# Patient Record
Sex: MALE | Race: WHITE | NOT HISPANIC OR LATINO | Employment: FULL TIME | URBAN - METROPOLITAN AREA
[De-identification: names, ages, dates, MRNs, and addresses within clinical notes are randomized per-mention and may not be internally consistent; named-entity substitution may affect disease eponyms.]

---

## 2023-09-15 PROBLEM — E11.9 TYPE 2 DIABETES MELLITUS (MULTI): Status: ACTIVE | Noted: 2023-09-15

## 2023-09-15 PROBLEM — E78.2 MIXED HYPERLIPIDEMIA: Status: ACTIVE | Noted: 2023-09-15

## 2023-09-15 PROBLEM — E10.39: Status: ACTIVE | Noted: 2023-09-15

## 2023-09-15 PROBLEM — S82.209A FRACTURE OF TIBIA: Status: ACTIVE | Noted: 2023-09-15

## 2023-09-15 PROBLEM — E10.65 TYPE 1 DIABETES MELLITUS WITH HYPERGLYCEMIA (MULTI): Status: ACTIVE | Noted: 2023-09-15

## 2023-09-15 PROBLEM — S82.402A: Status: ACTIVE | Noted: 2023-09-15

## 2023-09-15 PROBLEM — S82.202A UNSPECIFIED FRACTURE OF SHAFT OF LEFT TIBIA, INITIAL ENCOUNTER FOR CLOSED FRACTURE: Status: ACTIVE | Noted: 2023-09-15

## 2023-09-15 PROBLEM — M25.572 PAIN IN LEFT ANKLE AND JOINTS OF LEFT FOOT: Status: ACTIVE | Noted: 2023-09-15

## 2023-09-15 PROBLEM — S82.899A CLOSED FRACTURE OF ANKLE: Status: ACTIVE | Noted: 2023-09-15

## 2023-09-15 PROBLEM — I10 ESSENTIAL (PRIMARY) HYPERTENSION: Status: ACTIVE | Noted: 2023-09-15

## 2023-09-15 PROBLEM — L03.116 CELLULITIS OF LEFT LOWER LIMB: Status: ACTIVE | Noted: 2023-09-15

## 2023-09-15 RX ORDER — OXYCODONE AND ACETAMINOPHEN 5; 325 MG/1; MG/1
1 TABLET ORAL EVERY 4 HOURS PRN
COMMUNITY
End: 2024-04-11

## 2023-09-15 RX ORDER — INSULIN GLARGINE 300 U/ML
30 INJECTION, SOLUTION SUBCUTANEOUS DAILY
COMMUNITY
End: 2023-10-09

## 2023-09-15 RX ORDER — LISINOPRIL 40 MG/1
1 TABLET ORAL DAILY
COMMUNITY
End: 2024-04-11

## 2023-09-15 RX ORDER — INSULIN DEGLUDEC 100 U/ML
22 INJECTION, SOLUTION SUBCUTANEOUS DAILY
COMMUNITY
End: 2024-04-11

## 2023-09-15 RX ORDER — LISINOPRIL 20 MG/1
2 TABLET ORAL DAILY
COMMUNITY
End: 2023-11-20

## 2023-09-15 RX ORDER — NAPROXEN SODIUM 220 MG/1
1 TABLET, FILM COATED ORAL DAILY
COMMUNITY

## 2023-09-15 RX ORDER — FELODIPINE 5 MG/1
1 TABLET, EXTENDED RELEASE ORAL DAILY
COMMUNITY

## 2023-09-15 RX ORDER — INSULIN LISPRO 100 [IU]/ML
50 INJECTION, SOLUTION INTRAVENOUS; SUBCUTANEOUS DAILY
COMMUNITY
End: 2023-11-22 | Stop reason: SDUPTHER

## 2023-09-15 RX ORDER — METOPROLOL TARTRATE 50 MG/1
0.5 TABLET ORAL 2 TIMES DAILY
COMMUNITY

## 2023-09-15 RX ORDER — SIMVASTATIN 20 MG/1
1 TABLET, FILM COATED ORAL DAILY
COMMUNITY
End: 2024-03-15

## 2023-09-15 RX ORDER — AMLODIPINE BESYLATE 5 MG/1
1 TABLET ORAL DAILY
COMMUNITY
End: 2023-10-09

## 2023-10-08 DIAGNOSIS — E10.65 TYPE 1 DIABETES MELLITUS WITH HYPERGLYCEMIA (MULTI): Primary | ICD-10-CM

## 2023-10-09 RX ORDER — AMLODIPINE BESYLATE 5 MG/1
5 TABLET ORAL DAILY
Qty: 90 TABLET | Refills: 0 | Status: SHIPPED | OUTPATIENT
Start: 2023-10-09 | End: 2024-01-15

## 2023-10-09 RX ORDER — INSULIN GLARGINE 300 U/ML
INJECTION, SOLUTION SUBCUTANEOUS
Qty: 12 ML | Refills: 0 | Status: SHIPPED | OUTPATIENT
Start: 2023-10-09 | End: 2024-02-12

## 2023-11-19 DIAGNOSIS — I10 ESSENTIAL (PRIMARY) HYPERTENSION: Primary | ICD-10-CM

## 2023-11-20 RX ORDER — LISINOPRIL 20 MG/1
40 TABLET ORAL DAILY
Qty: 180 TABLET | Refills: 0 | Status: SHIPPED | OUTPATIENT
Start: 2023-11-20 | End: 2024-02-12

## 2023-11-22 DIAGNOSIS — E10.65 TYPE 1 DIABETES MELLITUS WITH HYPERGLYCEMIA (MULTI): Primary | ICD-10-CM

## 2023-11-22 RX ORDER — INSULIN LISPRO 100 [IU]/ML
50 INJECTION, SOLUTION INTRAVENOUS; SUBCUTANEOUS DAILY
Qty: 45 ML | Refills: 3 | Status: SHIPPED | OUTPATIENT
Start: 2023-11-22 | End: 2024-11-21

## 2024-01-15 DIAGNOSIS — E10.65 TYPE 1 DIABETES MELLITUS WITH HYPERGLYCEMIA (MULTI): ICD-10-CM

## 2024-01-15 RX ORDER — AMLODIPINE BESYLATE 5 MG/1
5 TABLET ORAL DAILY
Qty: 90 TABLET | Refills: 3 | Status: SHIPPED | OUTPATIENT
Start: 2024-01-15

## 2024-02-12 DIAGNOSIS — I10 ESSENTIAL (PRIMARY) HYPERTENSION: ICD-10-CM

## 2024-02-12 DIAGNOSIS — E10.65 TYPE 1 DIABETES MELLITUS WITH HYPERGLYCEMIA (MULTI): Primary | ICD-10-CM

## 2024-02-12 RX ORDER — INSULIN GLARGINE 300 U/ML
INJECTION, SOLUTION SUBCUTANEOUS
Qty: 12 ML | Refills: 0 | Status: SHIPPED | OUTPATIENT
Start: 2024-02-12

## 2024-02-12 RX ORDER — LISINOPRIL 20 MG/1
40 TABLET ORAL DAILY
Qty: 180 TABLET | Refills: 0 | Status: SHIPPED | OUTPATIENT
Start: 2024-02-12 | End: 2024-05-28

## 2024-02-12 RX ORDER — FLASH GLUCOSE SENSOR
KIT MISCELLANEOUS
Qty: 2 EACH | Refills: 0 | Status: SHIPPED | OUTPATIENT
Start: 2024-02-12 | End: 2024-03-15

## 2024-02-14 ENCOUNTER — TELEPHONE (OUTPATIENT)
Dept: ENDOCRINOLOGY | Facility: CLINIC | Age: 59
End: 2024-02-14
Payer: COMMERCIAL

## 2024-02-14 DIAGNOSIS — E10.65 TYPE 1 DIABETES MELLITUS WITH HYPERGLYCEMIA (MULTI): Primary | ICD-10-CM

## 2024-02-14 NOTE — TELEPHONE ENCOUNTER
Detail Level: Detailed
Left voice mail that orders are in place.  Advised patient to FAST for the labs.  
Patient requesting lab orders be placed so he can go for blood work prior to his appointment on 4/11/2024.  He has DM type 1.  He also states you monitor his blood pressure and cholesterol?      Please advise.       Last office visit appointment was 8/8/2023  
Quality 130: Documentation Of Current Medications In The Medical Record: Current Medications Documented

## 2024-02-17 ENCOUNTER — LAB (OUTPATIENT)
Dept: LAB | Facility: LAB | Age: 59
End: 2024-02-17
Payer: COMMERCIAL

## 2024-02-17 DIAGNOSIS — E10.65 TYPE 1 DIABETES MELLITUS WITH HYPERGLYCEMIA (MULTI): ICD-10-CM

## 2024-02-17 LAB
ALBUMIN SERPL-MCNC: 4.6 G/DL (ref 3.5–5)
ALP BLD-CCNC: 75 U/L (ref 35–125)
ALT SERPL-CCNC: 21 U/L (ref 5–40)
ANION GAP SERPL CALC-SCNC: 10 MMOL/L
AST SERPL-CCNC: 18 U/L (ref 5–40)
BASOPHILS # BLD AUTO: 0.07 X10*3/UL (ref 0–0.1)
BASOPHILS NFR BLD AUTO: 1 %
BILIRUB SERPL-MCNC: 1.5 MG/DL (ref 0.1–1.2)
BUN SERPL-MCNC: 14 MG/DL (ref 8–25)
CALCIUM SERPL-MCNC: 9.7 MG/DL (ref 8.5–10.4)
CHLORIDE SERPL-SCNC: 101 MMOL/L (ref 97–107)
CHOLEST SERPL-MCNC: 151 MG/DL (ref 133–200)
CHOLEST/HDLC SERPL: 2.6 {RATIO}
CO2 SERPL-SCNC: 30 MMOL/L (ref 24–31)
CREAT SERPL-MCNC: 0.9 MG/DL (ref 0.4–1.6)
EGFRCR SERPLBLD CKD-EPI 2021: >90 ML/MIN/1.73M*2
EOSINOPHIL # BLD AUTO: 0.36 X10*3/UL (ref 0–0.7)
EOSINOPHIL NFR BLD AUTO: 5 %
ERYTHROCYTE [DISTWIDTH] IN BLOOD BY AUTOMATED COUNT: 13.4 % (ref 11.5–14.5)
GLUCOSE SERPL-MCNC: 70 MG/DL (ref 65–99)
HCT VFR BLD AUTO: 47.4 % (ref 41–52)
HDLC SERPL-MCNC: 58 MG/DL
HGB BLD-MCNC: 15.8 G/DL (ref 13.5–17.5)
IMM GRANULOCYTES # BLD AUTO: 0.01 X10*3/UL (ref 0–0.7)
IMM GRANULOCYTES NFR BLD AUTO: 0.1 % (ref 0–0.9)
LDLC SERPL CALC-MCNC: 84 MG/DL (ref 65–130)
LYMPHOCYTES # BLD AUTO: 3.09 X10*3/UL (ref 1.2–4.8)
LYMPHOCYTES NFR BLD AUTO: 43.2 %
MCH RBC QN AUTO: 32.3 PG (ref 26–34)
MCHC RBC AUTO-ENTMCNC: 33.3 G/DL (ref 32–36)
MCV RBC AUTO: 97 FL (ref 80–100)
MONOCYTES # BLD AUTO: 0.69 X10*3/UL (ref 0.1–1)
MONOCYTES NFR BLD AUTO: 9.7 %
NEUTROPHILS # BLD AUTO: 2.93 X10*3/UL (ref 1.2–7.7)
NEUTROPHILS NFR BLD AUTO: 41 %
NRBC BLD-RTO: 0 /100 WBCS (ref 0–0)
PLATELET # BLD AUTO: 335 X10*3/UL (ref 150–450)
POTASSIUM SERPL-SCNC: 4.5 MMOL/L (ref 3.4–5.1)
PROT SERPL-MCNC: 6.9 G/DL (ref 5.9–7.9)
RBC # BLD AUTO: 4.89 X10*6/UL (ref 4.5–5.9)
SODIUM SERPL-SCNC: 141 MMOL/L (ref 133–145)
TRIGL SERPL-MCNC: 45 MG/DL (ref 40–150)
TSH SERPL DL<=0.05 MIU/L-ACNC: 2.67 MIU/L (ref 0.27–4.2)
WBC # BLD AUTO: 7.2 X10*3/UL (ref 4.4–11.3)

## 2024-02-17 PROCEDURE — 80053 COMPREHEN METABOLIC PANEL: CPT

## 2024-02-17 PROCEDURE — 85025 COMPLETE CBC W/AUTO DIFF WBC: CPT

## 2024-02-17 PROCEDURE — 36415 COLL VENOUS BLD VENIPUNCTURE: CPT

## 2024-02-17 PROCEDURE — 80061 LIPID PANEL: CPT

## 2024-02-17 PROCEDURE — 84443 ASSAY THYROID STIM HORMONE: CPT

## 2024-03-15 DIAGNOSIS — E10.65 TYPE 1 DIABETES MELLITUS WITH HYPERGLYCEMIA (MULTI): Primary | ICD-10-CM

## 2024-03-15 RX ORDER — SIMVASTATIN 20 MG/1
20 TABLET, FILM COATED ORAL DAILY
Qty: 90 TABLET | Refills: 0 | Status: SHIPPED | OUTPATIENT
Start: 2024-03-15 | End: 2024-04-11 | Stop reason: DRUGHIGH

## 2024-03-15 RX ORDER — FLASH GLUCOSE SENSOR
KIT MISCELLANEOUS
Qty: 2 EACH | Refills: 0 | Status: SHIPPED | OUTPATIENT
Start: 2024-03-15 | End: 2024-04-12

## 2024-04-10 NOTE — PROGRESS NOTES
HPI   57 yo with Diabetes 1 (dx age 15), HTN, Dyslipidemia presents for followup. Last A1c-6.7, today 7.5%.     Pt is testing sugars 4 times per day using geneva 2. Pt is having low sugars 1-2 times/week, doesn't sense lows. . Pt is following a carb controlled diet and knows reasonable carb allowances. Pt is able to afford their medications. Pt is not exercising but active at work.           Pt taking toujeo 20  units qhs, humalog 14 (12-14 ) units at meals, isf-50 (pt adjusts based on carbs as well).           On simva 20mg for lipids tolerating.           On lisinopril 20mg, felodipine 5mg, toprol tartrate 50mg (1/2) qd.           90 day libre2 data: 65% in range, 5% low, pattern: mid 100's overnight into waking, mid 100's during the day/bedtime.    -discussed AID, pt would consider in the future    Current Outpatient Medications:     amLODIPine (Norvasc) 5 mg tablet, Take 1 tablet by mouth once daily, Disp: 90 tablet, Rfl: 3    aspirin 81 mg chewable tablet, Chew 1 tablet (81 mg) once daily., Disp: , Rfl:     felodipine ER (Plendil) 5 mg 24 hr tablet, Take 1 tablet (5 mg) by mouth once daily., Disp: , Rfl:     FreeStyle Geneva 2 Sensor kit, USE AS DIRECTED AND REPLACE SENSOR EVERY 14 DAYS, Disp: 2 each, Rfl: 0    insulin lispro (HumaLOG) 100 unit/mL injection, Inject 50 Units under the skin once daily., Disp: 45 mL, Rfl: 3    lisinopril 20 mg tablet, Take 2 tablets by mouth once daily, Disp: 180 tablet, Rfl: 0    metoprolol tartrate (Lopressor) 50 mg tablet, Take 0.5 tablets by mouth 2 times a day., Disp: , Rfl:     simvastatin (Zocor) 20 mg tablet, Take 1 tablet by mouth once daily, Disp: 90 tablet, Rfl: 0    Toujeo SoloStar U-300 Insulin 300 unit/mL (1.5 mL) injection, INJECT UP TO 30 UNITS SUBCUTANEOUSLY DAILY AS DIRECTED, Disp: 12 mL, Rfl: 0      Allergies as of 04/11/2024    (No Known Allergies)         Review of Systems   Cardiology: Lightheadedness-denies.  Chest pain-denies.  Leg edema-denies.   "Palpitations-denies.  Respiratory: Cough-denies. Shortness of breath-denies.  Wheezing-denies.  Gastroenterology: Constipation-denies.  Diarrhea-denies.  Heartburn-denies.  Endocrinology: Cold intolerance-denies.  Heat intolerance-denies.  Sweats-denies.  Neurology: Headache-denies.  Tremor-denies.  Neuropathy in extremities-denies.  Psychology: Low energy-denies.  Irritability-denies.  Sleep disturbances-denies.      /73 (BP Location: Left arm)   Pulse 79   Wt 96.3 kg (212 lb 3.2 oz)   BMI 27.24 kg/m²       Labs:  Lab Results   Component Value Date    WBC 7.2 02/17/2024    NRBC 0.0 02/17/2024    RBC 4.89 02/17/2024    HGB 15.8 02/17/2024    HCT 47.4 02/17/2024     02/17/2024     Lab Results   Component Value Date    CALCIUM 9.7 02/17/2024    AST 18 02/17/2024    ALKPHOS 75 02/17/2024    BILITOT 1.5 (H) 02/17/2024    PROT 6.9 02/17/2024    ALBUMIN 4.6 02/17/2024    GLOB 2.5 09/02/2022    AGR 1.7 09/02/2022     02/17/2024    K 4.5 02/17/2024     02/17/2024    CO2 30 02/17/2024    ANIONGAP 10 02/17/2024    BUN 14 02/17/2024    CREATININE 0.90 02/17/2024    UREACREAUR 20.0 09/02/2022    GLUCOSE 70 02/17/2024    ALT 21 02/17/2024    EGFR >90 02/17/2024     Lab Results   Component Value Date    CHOL 151 02/17/2024    TRIG 45 02/17/2024    HDL 58.0 02/17/2024    LDLCALC 84 02/17/2024     Lab Results   Component Value Date    MICROALBCREA 8.8 09/02/2022     Lab Results   Component Value Date    TSH 2.67 02/17/2024     No results found for: \"[id number]\"  Lab Results   Component Value Date    HGBA1C 7.5 (A) 04/11/2024         Physical Exam   General Appearance: pleasant, cooperative, no acute distress  HEENT: no chemosis, no proptosis, no lid lag, no lid retraction  Neck: no lymphadenopathy, no thyromegaly, no dominant thyroid nodules  Heart: no murmurs, regular rate and rhythm, S1 and S2  Lungs: no wheezes, no rhonci, no rales  Extremities: no lower extremity swelling      Assessment/Plan   1. " Essential (primary) hypertension  -at target on therapy    2. Mixed hyperlipidemia  -change simva to atorvastatin 20mg, target ldl<70, labs reviewed     3. DM1  -A1c ordered and reviewed  -labs reviewed  -nohelia data reviewed  -please have dilated eye exam    -work on carb control, dosing more insulin for larger carb meals  -discussed AID, pt might consider in the future  Follow Up:  Dave 6 months    -labs/tests/notes reviewed  -reviewed and counseled patient on medication monitoring and side effects

## 2024-04-11 ENCOUNTER — OFFICE VISIT (OUTPATIENT)
Dept: ENDOCRINOLOGY | Facility: CLINIC | Age: 59
End: 2024-04-11
Payer: COMMERCIAL

## 2024-04-11 VITALS
BODY MASS INDEX: 27.24 KG/M2 | DIASTOLIC BLOOD PRESSURE: 73 MMHG | WEIGHT: 212.2 LBS | SYSTOLIC BLOOD PRESSURE: 134 MMHG | HEART RATE: 79 BPM

## 2024-04-11 DIAGNOSIS — I10 ESSENTIAL (PRIMARY) HYPERTENSION: ICD-10-CM

## 2024-04-11 DIAGNOSIS — E78.2 MIXED HYPERLIPIDEMIA: Primary | ICD-10-CM

## 2024-04-11 DIAGNOSIS — E10.65 TYPE 1 DIABETES MELLITUS WITH HYPERGLYCEMIA (MULTI): ICD-10-CM

## 2024-04-11 LAB — POC HEMOGLOBIN A1C: 7.5 % (ref 4.2–6.5)

## 2024-04-11 PROCEDURE — 3075F SYST BP GE 130 - 139MM HG: CPT | Performed by: INTERNAL MEDICINE

## 2024-04-11 PROCEDURE — 4010F ACE/ARB THERAPY RXD/TAKEN: CPT | Performed by: INTERNAL MEDICINE

## 2024-04-11 PROCEDURE — 83036 HEMOGLOBIN GLYCOSYLATED A1C: CPT | Performed by: INTERNAL MEDICINE

## 2024-04-11 PROCEDURE — 3048F LDL-C <100 MG/DL: CPT | Performed by: INTERNAL MEDICINE

## 2024-04-11 PROCEDURE — 1036F TOBACCO NON-USER: CPT | Performed by: INTERNAL MEDICINE

## 2024-04-11 PROCEDURE — 99214 OFFICE O/P EST MOD 30 MIN: CPT | Performed by: INTERNAL MEDICINE

## 2024-04-11 PROCEDURE — 3078F DIAST BP <80 MM HG: CPT | Performed by: INTERNAL MEDICINE

## 2024-04-11 RX ORDER — ATORVASTATIN CALCIUM 20 MG/1
20 TABLET, FILM COATED ORAL DAILY
Qty: 90 TABLET | Refills: 1 | Status: SHIPPED | OUTPATIENT
Start: 2024-04-11 | End: 2025-04-11

## 2024-04-11 ASSESSMENT — PATIENT HEALTH QUESTIONNAIRE - PHQ9
2. FEELING DOWN, DEPRESSED OR HOPELESS: NOT AT ALL
SUM OF ALL RESPONSES TO PHQ9 QUESTIONS 1 & 2: 0
1. LITTLE INTEREST OR PLEASURE IN DOING THINGS: NOT AT ALL

## 2024-04-11 ASSESSMENT — ENCOUNTER SYMPTOMS
OCCASIONAL FEELINGS OF UNSTEADINESS: 0
LOSS OF SENSATION IN FEET: 0
DEPRESSION: 0

## 2024-04-11 ASSESSMENT — PAIN SCALES - GENERAL: PAINLEVEL: 0-NO PAIN

## 2024-04-12 DIAGNOSIS — E10.65 TYPE 1 DIABETES MELLITUS WITH HYPERGLYCEMIA (MULTI): ICD-10-CM

## 2024-04-12 RX ORDER — FLASH GLUCOSE SENSOR
KIT MISCELLANEOUS
Qty: 2 EACH | Refills: 11 | Status: SHIPPED | OUTPATIENT
Start: 2024-04-12

## 2024-05-27 DIAGNOSIS — I10 ESSENTIAL (PRIMARY) HYPERTENSION: ICD-10-CM

## 2024-05-28 RX ORDER — LISINOPRIL 20 MG/1
40 TABLET ORAL DAILY
Qty: 180 TABLET | Refills: 1 | Status: SHIPPED | OUTPATIENT
Start: 2024-05-28

## 2024-07-05 DIAGNOSIS — E10.65 TYPE 1 DIABETES MELLITUS WITH HYPERGLYCEMIA (MULTI): ICD-10-CM

## 2024-07-05 DIAGNOSIS — E78.2 MIXED HYPERLIPIDEMIA: ICD-10-CM

## 2024-07-05 RX ORDER — INSULIN GLARGINE 300 [IU]/ML
INJECTION, SOLUTION SUBCUTANEOUS
Qty: 12 ML | Refills: 0 | Status: SHIPPED | OUTPATIENT
Start: 2024-07-05

## 2024-07-05 RX ORDER — INSULIN GLARGINE 300 [IU]/ML
INJECTION, SOLUTION SUBCUTANEOUS
Qty: 12 ML | Refills: 0 | Status: SHIPPED | OUTPATIENT
Start: 2024-07-05 | End: 2024-07-05 | Stop reason: SDUPTHER

## 2024-07-05 RX ORDER — INSULIN GLARGINE 300 U/ML
INJECTION, SOLUTION SUBCUTANEOUS
Qty: 12 ML | Refills: 0 | Status: SHIPPED | OUTPATIENT
Start: 2024-07-05 | End: 2024-07-05 | Stop reason: SDUPTHER

## 2024-07-05 NOTE — TELEPHONE ENCOUNTER
Patient called in requesting a refill on his Toujeo be sent into [facility], [place].   Rx pending to be sent, please assist and advise.   Thank you  Nola

## 2024-07-27 DIAGNOSIS — I10 ESSENTIAL (PRIMARY) HYPERTENSION: Primary | ICD-10-CM

## 2024-07-28 RX ORDER — METOPROLOL TARTRATE 50 MG/1
TABLET ORAL 2 TIMES DAILY
Qty: 90 TABLET | Refills: 0 | Status: SHIPPED | OUTPATIENT
Start: 2024-07-28

## 2024-07-31 ENCOUNTER — OFFICE VISIT (OUTPATIENT)
Dept: OPHTHALMOLOGY | Facility: CLINIC | Age: 59
End: 2024-07-31
Payer: COMMERCIAL

## 2024-07-31 DIAGNOSIS — H52.7 REFRACTION ERROR: ICD-10-CM

## 2024-07-31 DIAGNOSIS — H25.13 AGE-RELATED NUCLEAR CATARACT OF BOTH EYES: ICD-10-CM

## 2024-07-31 DIAGNOSIS — E10.65 TYPE 1 DIABETES MELLITUS WITH HYPERGLYCEMIA (MULTI): Primary | ICD-10-CM

## 2024-07-31 PROCEDURE — 99204 OFFICE O/P NEW MOD 45 MIN: CPT | Performed by: OPHTHALMOLOGY

## 2024-07-31 PROCEDURE — 99214 OFFICE O/P EST MOD 30 MIN: CPT | Performed by: OPHTHALMOLOGY

## 2024-07-31 ASSESSMENT — KERATOMETRY
OS_K2POWER_DIOPTERS: 43.00
OD_K2POWER_DIOPTERS: 42.50
OD_AXISANGLE2_DEGREES: 40
OS_AXISANGLE2_DEGREES: 115
OS_K1POWER_DIOPTERS: 42.50
OS_AXISANGLE_DEGREES: 25
OD_K1POWER_DIOPTERS: 42.25
OD_AXISANGLE_DEGREES: 130

## 2024-07-31 ASSESSMENT — CUP TO DISC RATIO
OD_RATIO: 0.35
OS_RATIO: 0.35

## 2024-07-31 ASSESSMENT — REFRACTION_WEARINGRX
SPECS_TYPE: READERS
OS_SPHERE: +3.00
OD_SPHERE: +3.00

## 2024-07-31 ASSESSMENT — EXTERNAL EXAM - RIGHT EYE: OD_EXAM: NORMAL

## 2024-07-31 ASSESSMENT — SLIT LAMP EXAM - LIDS
COMMENTS: NORMAL
COMMENTS: NORMAL

## 2024-07-31 ASSESSMENT — TONOMETRY
IOP_METHOD: GOLDMANN APPLANATION
OD_IOP_MMHG: 18
OS_IOP_MMHG: 18

## 2024-07-31 ASSESSMENT — VISUAL ACUITY
OD_PH_SC: 20/40
OS_SC+: -2
OD_SC: 20/60
METHOD: SNELLEN - LINEAR
OS_SC: 20/30

## 2024-07-31 ASSESSMENT — PATIENT HEALTH QUESTIONNAIRE - PHQ9
1. LITTLE INTEREST OR PLEASURE IN DOING THINGS: NOT AT ALL
2. FEELING DOWN, DEPRESSED OR HOPELESS: NOT AT ALL
SUM OF ALL RESPONSES TO PHQ9 QUESTIONS 1 AND 2: 0

## 2024-07-31 ASSESSMENT — PAIN SCALES - GENERAL: PAINLEVEL: 0-NO PAIN

## 2024-07-31 ASSESSMENT — EXTERNAL EXAM - LEFT EYE: OS_EXAM: NORMAL

## 2024-07-31 NOTE — PROGRESS NOTES
Assessment/Plan   Problem List Items Addressed This Visit       Type 1 diabetes mellitus with hyperglycemia (Multi) - Primary     No active diabetic retinopathy noted on exam today. Good coverage right eye (OD) with laser for previous retinopathy. Recommend to continue best sugar control and on need for annual checkup. Understands role of good BP control and weight loss if applicable. Discussed some components of selected studies like WESDR, DCCT, and UKPDS to describe the likely course of diabetes and effects on the eyes.           Age-related nuclear cataract of both eyes     Non significant cataract noted on exam. Will plan to continue to monitor with serial exam.            Refraction error     Discussed glasses prescription from refraction. Will provide if patient interested in keeping for records or to fill as a new set of glasses.               Provided reassurance regarding above diagnoses and care received in the office visit today. Discussed outcomes and options along with the importance of treatment compliance. Understands the importance of any follow up visits. Patient instructed to call/communicate with our office if any new issues, questions, or concerns.     Will plan to see back in 1 year full or sooner PRN

## 2024-07-31 NOTE — LETTER
July 31, 2024    Harris Acosta MD  [address]  [place] [address]    Patient: Delio Benítez   YOB: 1965   Date of Visit: 7/31/2024       Dear Dr. Harris Acosta MD:    Thank you for referring Delio Benítez to me for evaluation. Here is my assessment and plan of care:    Assessment/Plan:  Delio was seen today for new patient visit.  Diagnoses and all orders for this visit:  Type 1 diabetes mellitus with hyperglycemia (Multi) (Primary)  Age-related nuclear cataract of both eyes  Refraction error    Right eye:     Left eye:    [x] No diabetes mellitus (DM) retinopathy [x] No diabetes mellitus (DM) retinopathy    [] Mild non-proliferative retinopathy [] Mild non-proliferative retinopathy    [] Moderate non-proliferative retinopathy [] Moderate non-proliferative retinopathy    [] Severe non-proliferative retinopathy [] Severe non-proliferative retinopathy    [] Proliferative retinopathy   [] Proliferative retinopathy    [] Diabetic macular edema   [] Diabetic macular edema    Urged patient to continue to work on best blood sugar and blood pressure control  Advised patient to call if any new vision changes noted    Will plan to repeat evaluation in:   [] 3 months [] 6 months [] 9 months [x] 12 months [] Other    Below you can find relevant pieces of the exam. If you have questions, please do not hesitate to call me. I look forward to following Delio along with you.         Sincerely,        Caleb Lau MD        CC:   No Recipients         External Exam         Right Left    External Normal Normal              Slit Lamp Exam         Right Left    Lids/Lashes Normal Normal    Conjunctiva/Sclera White and quiet White and quiet    Cornea Clear Clear    Anterior Chamber Deep and quiet Deep and quiet    Iris Round and reactive Round and reactive    Lens normal clear lens capsule/cortex/nucleus, 1+ Nuclear sclerosis 1+ Nuclear sclerosis              Fundus Exam         Right Left     "Vitreous vitreous clear and normal vitreous clear and normal    Disc C:D Ratio 0.35 C:D Ratio 0.35    C/D Ratio 0.35 0.35    Macula Normal Normal    Vessels Normal Normal    Periphery  Normal                   <div id=\"[email or web address]\"></div>     "

## 2024-07-31 NOTE — ASSESSMENT & PLAN NOTE
No active diabetic retinopathy noted on exam today. Good coverage right eye (OD) with laser for previous retinopathy. Recommend to continue best sugar control and on need for annual checkup. Understands role of good BP control and weight loss if applicable. Discussed some components of selected studies like WESDR, DCCT, and UKPDS to describe the likely course of diabetes and effects on the eyes.

## 2024-07-31 NOTE — PATIENT INSTRUCTIONS
Thank you so much for choosing me to provide your care today!    If you were dilated your vision may remain blurry   or light sensitive for several hours.    The nature of eye and vision problems can require frequent follow up, please make every effort to adhere to any future appointments.    If you have any issues, questions, or concerns,   please do not hesitate to reach out.    If you receive a survey in regards to your care today, please mention any exceptional care my office staff and/or technicians provided.    You can reach our office at this number:  [phone]

## 2024-10-09 ENCOUNTER — TELEPHONE (OUTPATIENT)
Dept: ENDOCRINOLOGY | Facility: CLINIC | Age: 59
End: 2024-10-09
Payer: COMMERCIAL

## 2024-10-09 NOTE — TELEPHONE ENCOUNTER
Delio Benítez   1965   [id number]   [phone]       Called patient and left voice message in regards to see if patient can come in earlier on 10/11/24 at 1pm.

## 2024-10-10 NOTE — PROGRESS NOTES
HPI   58 yo with Diabetes 1 (dx age 15), HTN, Dyslipidemia presents for followup. Last A1c-7.5%, today 7.0%.      Pt is testing sugars 4 times per day using geneva 2. Pt is having low sugars 1-2 times/week, doesn't sense lows. . Pt is following a carb controlled diet and knows reasonable carb allowances. Pt is able to afford their medications. Pt is not exercising but active at work.           Pt taking toujeo 20 units qhs, humalog 14 (12-14 ) units at meals, isf-50 (pt adjusts based on carbs as well).           On atorvastatin 20mg for lipids tolerating.           On lisinopril 20mg, felodipine 5mg, toprol tartrate 50mg (1/2) qd.           90 day libre2 data: 65% in range, 4% low, pattern: upper 100's overnight into waking, mid 100's by lunch, low 100's at dinner, mid 100's bedtime.     -have discussed AID, pt would consider in the future          Current Outpatient Medications:     amLODIPine (Norvasc) 5 mg tablet, Take 1 tablet by mouth once daily, Disp: 90 tablet, Rfl: 3    aspirin 81 mg chewable tablet, Chew 1 tablet (81 mg) once daily., Disp: , Rfl:     atorvastatin (Lipitor) 20 mg tablet, Take 1 tablet (20 mg) by mouth once daily., Disp: 90 tablet, Rfl: 1    felodipine ER (Plendil) 5 mg 24 hr tablet, Take 1 tablet (5 mg) by mouth once daily., Disp: , Rfl:     flash glucose sensor kit (FreeStyle Geneva 2 Sensor) kit, REPLACE EVERY 14 DAYS AND USE AS DIRECTED, Disp: 2 each, Rfl: 11    insulin glargine (Toujeo SoloStar U-300 Insulin) 300 unit/mL (1.5 mL) injection, INJECT UP TO 30 UNITS SUBCUTANEOUSLY DAILY AS DIRECTED, Disp: 12 mL, Rfl: 0    insulin lispro (HumaLOG) 100 unit/mL injection, Inject 50 Units under the skin once daily., Disp: 45 mL, Rfl: 3    lisinopril 20 mg tablet, Take 2 tablets by mouth once daily, Disp: 180 tablet, Rfl: 1    metoprolol tartrate (Lopressor) 50 mg tablet, TAKE ONE-HALF TABLET BY MOUTH TWICE DAILY, Disp: 90 tablet, Rfl: 0      Allergies as of 10/11/2024    (No Known Allergies)  "        Review of Systems   Cardiology: Lightheadedness-denies.  Chest pain-denies.  Leg edema-denies.  Palpitations-denies.  Respiratory: Cough-denies. Shortness of breath-denies.  Wheezing-denies.  Gastroenterology: Constipation-denies.  Diarrhea-denies.  Heartburn-denies.  Endocrinology: Cold intolerance-denies.  Heat intolerance-denies.  Sweats-denies.  Neurology: Headache-denies.  Tremor-denies.  Neuropathy in extremities-denies.  Psychology: Low energy-denies.  Irritability-denies.  Sleep disturbances-denies.      /57 (BP Location: Left arm)   Pulse 72   Ht 1.88 m (6' 2\")   Wt 97.8 kg (215 lb 9.6 oz)   BMI 27.68 kg/m²       Labs:  Lab Results   Component Value Date    WBC 7.2 02/17/2024    NRBC 0.0 02/17/2024    RBC 4.89 02/17/2024    HGB 15.8 02/17/2024    HCT 47.4 02/17/2024     02/17/2024     Lab Results   Component Value Date    CALCIUM 9.7 02/17/2024    AST 18 02/17/2024    ALKPHOS 75 02/17/2024    BILITOT 1.5 (H) 02/17/2024    PROT 6.9 02/17/2024    ALBUMIN 4.6 02/17/2024    GLOB 2.5 09/02/2022    AGR 1.7 09/02/2022     02/17/2024    K 4.5 02/17/2024     02/17/2024    CO2 30 02/17/2024    ANIONGAP 10 02/17/2024    BUN 14 02/17/2024    CREATININE 0.90 02/17/2024    UREACREAUR 20.0 09/02/2022    GLUCOSE 70 02/17/2024    ALT 21 02/17/2024    EGFR >90 02/17/2024     Lab Results   Component Value Date    CHOL 151 02/17/2024    TRIG 45 02/17/2024    HDL 58.0 02/17/2024    LDLCALC 84 02/17/2024     Lab Results   Component Value Date    MICROALBCREA 8.8 09/02/2022     Lab Results   Component Value Date    TSH 2.67 02/17/2024     No results found for: \"PHNMNFRV23\"  Lab Results   Component Value Date    HGBA1C 7.0 (A) 10/11/2024         Physical Exam   General Appearance: pleasant, cooperative, no acute distress  HEENT: no chemosis, no proptosis, no lid lag, no lid retraction  Neck: no lymphadenopathy, no thyromegaly, no dominant thyroid nodules  Heart: no murmurs, regular rate and " rhythm, S1 and S2  Lungs: no wheezes, no rhonci, no rales  Extremities: no lower extremity swelling      Assessment/Plan   1. Type 1 diabetes mellitus with hyperglycemia (Multi)  -A1c ordered and reviewed  -labs reviewed/ labs ordered  -nohelia data reviewed    -pt with lance phenomenon  -could try extra 1-2 units basal, likely risks lows doing this  -had long discussion/gave demo of tslim insulin pumps  -reviewed how they work/where to wear them/gave handouts for more info    2. Mixed hyperlipidemia  -on statin and tolerating   -repeat labs ordered    3. Essential (primary) hypertension  -at target on therapy, will follow, no change         Follow Up:  Dave 6 months    Medical Decision Making  Complexity of problem: Chronic illness of diabetes mellitus uncontrolled, progressing  Data analyzed and reviewed: Reviewed prior notes, blood glucose data, labs including HgbA1c, lipids, serum chemistries.  Ordered tests.   Risk of complications and morbidities: Is definite because of use of insulin and risk of hypoglycemia.  Prescription medications reviewed and modifications made.  Compliance assessed.  Addressed social determinants of health including food insecurity.

## 2024-10-11 ENCOUNTER — APPOINTMENT (OUTPATIENT)
Dept: ENDOCRINOLOGY | Facility: CLINIC | Age: 59
End: 2024-10-11
Payer: COMMERCIAL

## 2024-10-11 VITALS
BODY MASS INDEX: 27.67 KG/M2 | DIASTOLIC BLOOD PRESSURE: 57 MMHG | SYSTOLIC BLOOD PRESSURE: 112 MMHG | WEIGHT: 215.6 LBS | HEART RATE: 72 BPM | HEIGHT: 74 IN

## 2024-10-11 DIAGNOSIS — E78.2 MIXED HYPERLIPIDEMIA: ICD-10-CM

## 2024-10-11 DIAGNOSIS — I10 ESSENTIAL (PRIMARY) HYPERTENSION: ICD-10-CM

## 2024-10-11 DIAGNOSIS — E10.65 TYPE 1 DIABETES MELLITUS WITH HYPERGLYCEMIA (MULTI): Primary | ICD-10-CM

## 2024-10-11 LAB — POC HEMOGLOBIN A1C: 7 % (ref 4.2–6.5)

## 2024-10-11 PROCEDURE — 3078F DIAST BP <80 MM HG: CPT | Performed by: INTERNAL MEDICINE

## 2024-10-11 PROCEDURE — 3008F BODY MASS INDEX DOCD: CPT | Performed by: INTERNAL MEDICINE

## 2024-10-11 PROCEDURE — 3074F SYST BP LT 130 MM HG: CPT | Performed by: INTERNAL MEDICINE

## 2024-10-11 PROCEDURE — 99214 OFFICE O/P EST MOD 30 MIN: CPT | Performed by: INTERNAL MEDICINE

## 2024-10-11 PROCEDURE — 4010F ACE/ARB THERAPY RXD/TAKEN: CPT | Performed by: INTERNAL MEDICINE

## 2024-10-11 PROCEDURE — 1036F TOBACCO NON-USER: CPT | Performed by: INTERNAL MEDICINE

## 2024-10-11 PROCEDURE — 83036 HEMOGLOBIN GLYCOSYLATED A1C: CPT | Performed by: INTERNAL MEDICINE

## 2024-10-11 PROCEDURE — 3048F LDL-C <100 MG/DL: CPT | Performed by: INTERNAL MEDICINE

## 2024-10-11 ASSESSMENT — PATIENT HEALTH QUESTIONNAIRE - PHQ9
1. LITTLE INTEREST OR PLEASURE IN DOING THINGS: NOT AT ALL
SUM OF ALL RESPONSES TO PHQ9 QUESTIONS 1 & 2: 0
1. LITTLE INTEREST OR PLEASURE IN DOING THINGS: NOT AT ALL
2. FEELING DOWN, DEPRESSED OR HOPELESS: NOT AT ALL
SUM OF ALL RESPONSES TO PHQ9 QUESTIONS 1 & 2: 0
2. FEELING DOWN, DEPRESSED OR HOPELESS: NOT AT ALL

## 2024-10-11 ASSESSMENT — PAIN SCALES - GENERAL: PAINLEVEL: 0-NO PAIN

## 2024-10-11 ASSESSMENT — ENCOUNTER SYMPTOMS
OCCASIONAL FEELINGS OF UNSTEADINESS: 0
DEPRESSION: 0
LOSS OF SENSATION IN FEET: 0

## 2024-10-19 DIAGNOSIS — E10.65 TYPE 1 DIABETES MELLITUS WITH HYPERGLYCEMIA (MULTI): ICD-10-CM

## 2024-10-21 DIAGNOSIS — I10 ESSENTIAL (PRIMARY) HYPERTENSION: ICD-10-CM

## 2024-10-21 RX ORDER — INSULIN GLARGINE 300 [IU]/ML
INJECTION, SOLUTION SUBCUTANEOUS
Qty: 12 ML | Refills: 0 | Status: SHIPPED | OUTPATIENT
Start: 2024-10-21

## 2024-10-21 RX ORDER — METOPROLOL TARTRATE 50 MG/1
50 TABLET ORAL 2 TIMES DAILY
Qty: 90 TABLET | Refills: 0 | Status: SHIPPED | OUTPATIENT
Start: 2024-10-21

## 2024-12-01 DIAGNOSIS — E10.65 TYPE 1 DIABETES MELLITUS WITH HYPERGLYCEMIA (MULTI): ICD-10-CM

## 2024-12-01 DIAGNOSIS — I10 ESSENTIAL (PRIMARY) HYPERTENSION: ICD-10-CM

## 2024-12-01 RX ORDER — LISINOPRIL 20 MG/1
40 TABLET ORAL DAILY
Qty: 180 TABLET | Refills: 0 | Status: SHIPPED | OUTPATIENT
Start: 2024-12-01

## 2024-12-01 RX ORDER — INSULIN LISPRO 100 [IU]/ML
50 INJECTION, SOLUTION INTRAVENOUS; SUBCUTANEOUS DAILY
Qty: 15 ML | Refills: 11 | Status: SHIPPED | OUTPATIENT
Start: 2024-12-01

## 2024-12-30 DIAGNOSIS — E78.2 MIXED HYPERLIPIDEMIA: ICD-10-CM

## 2024-12-30 RX ORDER — ATORVASTATIN CALCIUM 20 MG/1
20 TABLET, FILM COATED ORAL DAILY
Qty: 90 TABLET | Refills: 0 | Status: SHIPPED | OUTPATIENT
Start: 2024-12-30

## 2025-01-29 DIAGNOSIS — I10 ESSENTIAL (PRIMARY) HYPERTENSION: ICD-10-CM

## 2025-01-29 DIAGNOSIS — E10.65 TYPE 1 DIABETES MELLITUS WITH HYPERGLYCEMIA (MULTI): ICD-10-CM

## 2025-01-29 RX ORDER — AMLODIPINE BESYLATE 5 MG/1
5 TABLET ORAL DAILY
Qty: 90 TABLET | Refills: 3 | Status: SHIPPED | OUTPATIENT
Start: 2025-01-29

## 2025-01-29 RX ORDER — METOPROLOL TARTRATE 50 MG/1
50 TABLET ORAL 2 TIMES DAILY
Qty: 90 TABLET | Refills: 0 | Status: SHIPPED | OUTPATIENT
Start: 2025-01-29

## 2025-03-01 DIAGNOSIS — I10 ESSENTIAL (PRIMARY) HYPERTENSION: ICD-10-CM

## 2025-03-03 RX ORDER — LISINOPRIL 20 MG/1
40 TABLET ORAL DAILY
Qty: 180 TABLET | Refills: 0 | Status: SHIPPED | OUTPATIENT
Start: 2025-03-03

## 2025-03-06 DIAGNOSIS — E10.65 TYPE 1 DIABETES MELLITUS WITH HYPERGLYCEMIA (MULTI): ICD-10-CM

## 2025-03-06 RX ORDER — FLASH GLUCOSE SENSOR
KIT MISCELLANEOUS
Qty: 2 EACH | Refills: 11 | Status: SHIPPED | OUTPATIENT
Start: 2025-03-06

## 2025-04-02 DIAGNOSIS — I10 ESSENTIAL (PRIMARY) HYPERTENSION: ICD-10-CM

## 2025-04-02 DIAGNOSIS — E78.2 MIXED HYPERLIPIDEMIA: ICD-10-CM

## 2025-04-03 RX ORDER — METOPROLOL TARTRATE 50 MG/1
50 TABLET ORAL 2 TIMES DAILY
Qty: 90 TABLET | Refills: 0 | Status: SHIPPED | OUTPATIENT
Start: 2025-04-03

## 2025-04-03 RX ORDER — ATORVASTATIN CALCIUM 20 MG/1
20 TABLET, FILM COATED ORAL DAILY
Qty: 90 TABLET | Refills: 0 | Status: SHIPPED | OUTPATIENT
Start: 2025-04-03

## 2025-04-04 DIAGNOSIS — E10.65 TYPE 1 DIABETES MELLITUS WITH HYPERGLYCEMIA (MULTI): ICD-10-CM

## 2025-04-04 RX ORDER — INSULIN GLARGINE 300 [IU]/ML
INJECTION, SOLUTION SUBCUTANEOUS
Qty: 12 ML | Refills: 2 | Status: SHIPPED | OUTPATIENT
Start: 2025-04-04

## 2025-04-10 NOTE — PROGRESS NOTES
HPI   58 yo with Diabetes 1 (dx age 15), HTN, Dyslipidemia presents for followup. Last A1c-7.0%, today 7.0%.      Pt is testing sugars 4 times per day using geneva 2. Pt is having low sugars 1-2 times/week, doesn't sense lows. . Pt is following a carb controlled diet and knows reasonable carb allowances. Pt is able to afford their medications. Pt is not exercising but active at work.           Pt taking toujeo 20 units qhs, humalog 14 (12-14 ) units at meals, isf-50 (pt adjusts based on carbs as well).           On atorvastatin 20mg for lipids tolerating.           On lisinopril 20mg, felodipine 5mg, toprol tartrate 50mg (1/2) qd.           14 day libre2 data: 71% in range, 1% low, pattern: upper 100's overnight into waking, mid 100's by lunch, low 100's at dinner, mid 100's bedtime, similar to previous visits.     -have discussed AID, pt would consider in the future       Current Outpatient Medications:     amLODIPine (Norvasc) 5 mg tablet, Take 1 tablet (5 mg) by mouth once daily., Disp: 90 tablet, Rfl: 3    aspirin 81 mg chewable tablet, Chew 1 tablet (81 mg) once daily., Disp: , Rfl:     atorvastatin (Lipitor) 20 mg tablet, Take 1 tablet by mouth once daily, Disp: 90 tablet, Rfl: 0    felodipine ER (Plendil) 5 mg 24 hr tablet, Take 1 tablet (5 mg) by mouth once daily., Disp: , Rfl:     flash glucose sensor kit (FreeStyle Geneva 2 Sensor) kit, Use as instructed, Disp: 2 each, Rfl: 11    insulin glargine (Toujeo SoloStar U-300 Insulin) 300 unit/mL (1.5 mL) pen, INJECT UP TO 30 UNITS SUBCUTANEOUSLY DAILY AS DIRECTED, Disp: 12 mL, Rfl: 2    insulin lispro (HumaLOG) 100 unit/mL injection, INJECT 50 UNITS SUBCUTANEOUSLY ONCE DAILY, Disp: 15 mL, Rfl: 11    lisinopril 20 mg tablet, Take 2 tablets by mouth once daily, Disp: 180 tablet, Rfl: 0    metoprolol tartrate (Lopressor) 50 mg tablet, Take 1 tablet by mouth twice daily, Disp: 90 tablet, Rfl: 0      Allergies as of 04/11/2025    (No Known Allergies)         Review of  "Systems   Cardiology: Lightheadedness-denies.  Chest pain-denies.  Leg edema-denies.  Palpitations-denies.  Respiratory: Cough-denies. Shortness of breath-denies.  Wheezing-denies.  Gastroenterology: Constipation-denies.  Diarrhea-denies.  Heartburn-denies.  Endocrinology: Cold intolerance-denies.  Heat intolerance-denies.  Sweats-denies.  Neurology: Headache-denies.  Tremor-denies.  Neuropathy in extremities-denies.  Psychology: Low energy-denies.  Irritability-denies.  Sleep disturbances-denies.      /60 (BP Location: Left arm, Patient Position: Sitting)   Pulse 69   Wt 95.7 kg (211 lb)   BMI 27.09 kg/m²       Labs:  Lab Results   Component Value Date    WBC 7.2 02/17/2024    NRBC 0.0 02/17/2024    RBC 4.89 02/17/2024    HGB 15.8 02/17/2024    HCT 47.4 02/17/2024     02/17/2024     Lab Results   Component Value Date    CALCIUM 9.7 02/17/2024    AST 18 02/17/2024    ALKPHOS 75 02/17/2024    BILITOT 1.5 (H) 02/17/2024    PROT 6.9 02/17/2024    ALBUMIN 4.6 02/17/2024    GLOB 2.5 09/02/2022    AGR 1.7 09/02/2022     02/17/2024    K 4.5 02/17/2024     02/17/2024    CO2 30 02/17/2024    ANIONGAP 10 02/17/2024    BUN 14 02/17/2024    CREATININE 0.90 02/17/2024    UREACREAUR 20.0 09/02/2022    GLUCOSE 70 02/17/2024    ALT 21 02/17/2024    EGFR >90 02/17/2024     Lab Results   Component Value Date    CHOL 151 02/17/2024    TRIG 45 02/17/2024    HDL 58.0 02/17/2024    LDLCALC 84 02/17/2024     Lab Results   Component Value Date    MICROALBCREA 8.8 09/02/2022     Lab Results   Component Value Date    TSH 2.67 02/17/2024     No results found for: \"OEIFZBQG32\"  Lab Results   Component Value Date    HGBA1C 7.0 (A) 04/11/2025         Physical Exam   General Appearance: pleasant, cooperative, no acute distress  HEENT: no chemosis, no proptosis, no lid lag, no lid retraction  Neck: no lymphadenopathy, no thyromegaly, no dominant thyroid nodules  Heart: no murmurs, regular rate and rhythm, S1 and " S2  Lungs: no wheezes, no rhonci, no rales  Extremities: no lower extremity swelling      Assessment/Plan   1. Type 1 diabetes mellitus with hyperglycemia (Multi) (Primary)  -A1c ordered and reviewed  -nohelia data reviewed  -labs reviewed, new labs ordered    -overall stable, no obvious area to make a change  -discussed AID (pumps/sensor) again today  -fine adjustments for food/activity    2. Mixed hyperlipidemia  -on statin, target ldl<70    3. Essential (primary) hypertension  -at target on therapy, will follow, no change         Follow Up:  6 months    Medical Decision Making  Complexity of problem: Chronic illness of diabetes mellitus uncontrolled, progressing  Data analyzed and reviewed: Reviewed prior notes, blood glucose data, labs including HgbA1c, lipids, serum chemistries.  Ordered tests.   Risk of complications and morbidities: Is definite because of use of insulin and risk of hypoglycemia.  Prescription medications reviewed and modifications made.  Compliance assessed.  Addressed social determinants of health including food insecurity.

## 2025-04-11 ENCOUNTER — APPOINTMENT (OUTPATIENT)
Dept: ENDOCRINOLOGY | Facility: CLINIC | Age: 60
End: 2025-04-11
Payer: COMMERCIAL

## 2025-04-11 VITALS
DIASTOLIC BLOOD PRESSURE: 60 MMHG | WEIGHT: 211 LBS | SYSTOLIC BLOOD PRESSURE: 119 MMHG | BODY MASS INDEX: 27.09 KG/M2 | HEART RATE: 69 BPM

## 2025-04-11 DIAGNOSIS — E78.2 MIXED HYPERLIPIDEMIA: ICD-10-CM

## 2025-04-11 DIAGNOSIS — E10.65 TYPE 1 DIABETES MELLITUS WITH HYPERGLYCEMIA (MULTI): Primary | ICD-10-CM

## 2025-04-11 DIAGNOSIS — I10 ESSENTIAL (PRIMARY) HYPERTENSION: ICD-10-CM

## 2025-04-11 LAB — POC HEMOGLOBIN A1C: 7 % (ref 4.2–6.5)

## 2025-04-11 PROCEDURE — 3074F SYST BP LT 130 MM HG: CPT | Performed by: INTERNAL MEDICINE

## 2025-04-11 PROCEDURE — 3051F HG A1C>EQUAL 7.0%<8.0%: CPT | Performed by: INTERNAL MEDICINE

## 2025-04-11 PROCEDURE — 1036F TOBACCO NON-USER: CPT | Performed by: INTERNAL MEDICINE

## 2025-04-11 PROCEDURE — 4010F ACE/ARB THERAPY RXD/TAKEN: CPT | Performed by: INTERNAL MEDICINE

## 2025-04-11 PROCEDURE — 83036 HEMOGLOBIN GLYCOSYLATED A1C: CPT | Performed by: INTERNAL MEDICINE

## 2025-04-11 PROCEDURE — 3078F DIAST BP <80 MM HG: CPT | Performed by: INTERNAL MEDICINE

## 2025-04-11 PROCEDURE — 99214 OFFICE O/P EST MOD 30 MIN: CPT | Performed by: INTERNAL MEDICINE

## 2025-04-11 ASSESSMENT — PATIENT HEALTH QUESTIONNAIRE - PHQ9
SUM OF ALL RESPONSES TO PHQ9 QUESTIONS 1 & 2: 0
2. FEELING DOWN, DEPRESSED OR HOPELESS: NOT AT ALL
1. LITTLE INTEREST OR PLEASURE IN DOING THINGS: NOT AT ALL

## 2025-04-11 ASSESSMENT — PAIN SCALES - GENERAL: PAINLEVEL_OUTOF10: 0-NO PAIN

## 2025-05-18 DIAGNOSIS — I10 ESSENTIAL (PRIMARY) HYPERTENSION: ICD-10-CM

## 2025-05-19 RX ORDER — LISINOPRIL 20 MG/1
40 TABLET ORAL DAILY
Qty: 180 TABLET | Refills: 0 | Status: SHIPPED | OUTPATIENT
Start: 2025-05-19

## 2025-07-02 DIAGNOSIS — I10 ESSENTIAL (PRIMARY) HYPERTENSION: ICD-10-CM

## 2025-07-02 RX ORDER — LISINOPRIL 20 MG/1
40 TABLET ORAL DAILY
Qty: 180 TABLET | Refills: 0 | Status: SHIPPED | OUTPATIENT
Start: 2025-07-02

## 2025-07-03 DIAGNOSIS — E78.2 MIXED HYPERLIPIDEMIA: ICD-10-CM

## 2025-07-03 RX ORDER — ATORVASTATIN CALCIUM 20 MG/1
20 TABLET, FILM COATED ORAL DAILY
Qty: 90 TABLET | Refills: 0 | Status: SHIPPED | OUTPATIENT
Start: 2025-07-03

## 2025-07-11 ENCOUNTER — TELEPHONE (OUTPATIENT)
Facility: CLINIC | Age: 60
End: 2025-07-11
Payer: COMMERCIAL

## 2025-07-11 DIAGNOSIS — E10.65 TYPE 1 DIABETES MELLITUS WITH HYPERGLYCEMIA (MULTI): ICD-10-CM

## 2025-07-11 RX ORDER — FLASH GLUCOSE SENSOR
KIT MISCELLANEOUS
Qty: 2 EACH | Refills: 11 | Status: SHIPPED | OUTPATIENT
Start: 2025-07-11

## 2025-07-14 DIAGNOSIS — E10.65 TYPE 1 DIABETES MELLITUS WITH HYPERGLYCEMIA (MULTI): Primary | ICD-10-CM

## 2025-07-18 DIAGNOSIS — I10 ESSENTIAL (PRIMARY) HYPERTENSION: ICD-10-CM

## 2025-07-18 RX ORDER — METOPROLOL TARTRATE 50 MG/1
50 TABLET ORAL 2 TIMES DAILY
Qty: 90 TABLET | Refills: 0 | Status: SHIPPED | OUTPATIENT
Start: 2025-07-18

## 2025-08-11 ENCOUNTER — APPOINTMENT (OUTPATIENT)
Dept: OPHTHALMOLOGY | Facility: CLINIC | Age: 60
End: 2025-08-11
Payer: COMMERCIAL

## 2025-08-28 ENCOUNTER — TELEPHONE (OUTPATIENT)
Dept: OPHTHALMOLOGY | Facility: CLINIC | Age: 60
End: 2025-08-28
Payer: COMMERCIAL

## 2025-10-13 ENCOUNTER — APPOINTMENT (OUTPATIENT)
Dept: ENDOCRINOLOGY | Facility: CLINIC | Age: 60
End: 2025-10-13
Payer: COMMERCIAL